# Patient Record
Sex: MALE | URBAN - METROPOLITAN AREA
[De-identification: names, ages, dates, MRNs, and addresses within clinical notes are randomized per-mention and may not be internally consistent; named-entity substitution may affect disease eponyms.]

---
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
## 2018-03-05 ENCOUNTER — APPOINTMENT (RX ONLY)
Dept: URBAN - METROPOLITAN AREA CLINIC 156 | Facility: CLINIC | Age: 18
Setting detail: DERMATOLOGY
End: 2018-03-05

## 2018-03-05 DIAGNOSIS — B07.8 OTHER VIRAL WARTS: ICD-10-CM

## 2018-03-05 PROBLEM — L70.0 ACNE VULGARIS: Status: ACTIVE | Noted: 2018-03-05

## 2018-03-05 PROCEDURE — ? BENIGN DESTRUCTION

## 2018-03-05 PROCEDURE — 17110 DESTRUCTION B9 LES UP TO 14: CPT

## 2018-03-05 PROCEDURE — ? PULSED-DYE LASER

## 2018-03-05 ASSESSMENT — LOCATION SIMPLE DESCRIPTION DERM
LOCATION SIMPLE: RIGHT INDEX FINGER
LOCATION SIMPLE: LEFT HAND
LOCATION SIMPLE: RIGHT HAND

## 2018-03-05 ASSESSMENT — LOCATION DETAILED DESCRIPTION DERM
LOCATION DETAILED: RIGHT PROXIMAL DORSAL INDEX FINGER
LOCATION DETAILED: LEFT ULNAR DORSAL HAND
LOCATION DETAILED: RIGHT DORSAL INDEX METACARPOPHALANGEAL JOINT

## 2018-03-05 ASSESSMENT — LOCATION ZONE DERM
LOCATION ZONE: FINGER
LOCATION ZONE: HAND

## 2018-03-05 NOTE — PROCEDURE: PULSED-DYE LASER
Cryogen Time (Ms): 30
Laser Type: Vbeam 595nm
Spot Size: 5 mm
Fluence In J/Cm2 (Optional): 15
Delay Time (Ms): 20
Pulse Duration: 10 ms
Delay Time (Ms): 20
Post-Procedure Care: Vaseline and ice applied. Post care reviewed with patient.
Cryogen Time (Ms): 30
Pulse Duration: 20 ms
Spot Size: 7 mm
Cryogen Time (Ms): 0
Treated Area: small area
Detail Level: Zone
Post-Care Instructions: I reviewed with the patient in detail post-care instructions. Patient should stay away from the sun and wear sun protection until treated areas are fully healed.
Consent: Written consent obtained, risks reviewed including but not limited to crusting, scabbing, blistering, scarring, darker or lighter pigmentary change, incidental hair removal, bruising, and/or incomplete removal.
Location Override: bilateral hands
Immediate Endpoint: purpura

## 2018-03-05 NOTE — PROCEDURE: BENIGN DESTRUCTION
Post-Care Instructions: I reviewed with the patient in detail post-care instructions. Patient is to wear sunprotection, and avoid picking at any of the treated lesions. Pt may apply Vaseline to crusted or scabbing areas.
Detail Level: Detailed
Treatment Number (Will Not Render If 0): 0
Add 52 Modifier (Optional): no
Medical Necessity Information: It is in your best interest to select a reason for this procedure from the list below. All of these items fulfill various CMS LCD requirements except the new and changing color options.
Medical Necessity Clause: This procedure was medically necessary because the lesions that were treated were:
Consent: The patient's consent was obtained including but not limited to risks of crusting, scabbing, blistering, scarring, darker or lighter pigmentary change, recurrence, incomplete removal and infection.

## 2018-04-10 NOTE — PROCEDURE: PRESCRIPTION SAMPLES PROVIDED
Expiration Date (Optional): 9/2017
Samples Given: Tazorac 0.1% cream
Lot/Batch Number (Optional): [id number]
Detail Level: Zone
Yes